# Patient Record
Sex: FEMALE | Race: WHITE | NOT HISPANIC OR LATINO | URBAN - METROPOLITAN AREA
[De-identification: names, ages, dates, MRNs, and addresses within clinical notes are randomized per-mention and may not be internally consistent; named-entity substitution may affect disease eponyms.]

---

## 2019-06-05 ENCOUNTER — EMERGENCY (EMERGENCY)
Facility: HOSPITAL | Age: 24
LOS: 1 days | Discharge: HOME | End: 2019-06-05
Attending: EMERGENCY MEDICINE | Admitting: EMERGENCY MEDICINE
Payer: COMMERCIAL

## 2019-06-05 VITALS
DIASTOLIC BLOOD PRESSURE: 70 MMHG | SYSTOLIC BLOOD PRESSURE: 113 MMHG | TEMPERATURE: 97 F | OXYGEN SATURATION: 100 % | WEIGHT: 134.92 LBS | HEART RATE: 80 BPM | RESPIRATION RATE: 14 BRPM

## 2019-06-05 VITALS
DIASTOLIC BLOOD PRESSURE: 88 MMHG | HEART RATE: 82 BPM | TEMPERATURE: 98 F | SYSTOLIC BLOOD PRESSURE: 144 MMHG | OXYGEN SATURATION: 99 % | RESPIRATION RATE: 16 BRPM

## 2019-06-05 LAB
ALBUMIN SERPL ELPH-MCNC: 4.6 G/DL — SIGNIFICANT CHANGE UP (ref 3.3–5)
ALP SERPL-CCNC: 28 U/L — LOW (ref 40–120)
ALT FLD-CCNC: 8 U/L — LOW (ref 10–45)
ANION GAP SERPL CALC-SCNC: 9 MMOL/L — SIGNIFICANT CHANGE UP (ref 5–17)
APPEARANCE UR: CLEAR — SIGNIFICANT CHANGE UP
AST SERPL-CCNC: 12 U/L — SIGNIFICANT CHANGE UP (ref 10–40)
BASOPHILS # BLD AUTO: 0.01 K/UL — SIGNIFICANT CHANGE UP (ref 0–0.2)
BASOPHILS NFR BLD AUTO: 0.2 % — SIGNIFICANT CHANGE UP (ref 0–2)
BILIRUB SERPL-MCNC: 0.2 MG/DL — SIGNIFICANT CHANGE UP (ref 0.2–1.2)
BILIRUB UR-MCNC: NEGATIVE — SIGNIFICANT CHANGE UP
BUN SERPL-MCNC: 14 MG/DL — SIGNIFICANT CHANGE UP (ref 7–23)
CALCIUM SERPL-MCNC: 8.7 MG/DL — SIGNIFICANT CHANGE UP (ref 8.4–10.5)
CHLORIDE SERPL-SCNC: 107 MMOL/L — SIGNIFICANT CHANGE UP (ref 96–108)
CO2 SERPL-SCNC: 24 MMOL/L — SIGNIFICANT CHANGE UP (ref 22–31)
COLOR SPEC: YELLOW — SIGNIFICANT CHANGE UP
CREAT SERPL-MCNC: 0.72 MG/DL — SIGNIFICANT CHANGE UP (ref 0.5–1.3)
DIFF PNL FLD: NEGATIVE — SIGNIFICANT CHANGE UP
EOSINOPHIL # BLD AUTO: 0.14 K/UL — SIGNIFICANT CHANGE UP (ref 0–0.5)
EOSINOPHIL NFR BLD AUTO: 2.5 % — SIGNIFICANT CHANGE UP (ref 0–6)
GLUCOSE SERPL-MCNC: 130 MG/DL — HIGH (ref 70–99)
GLUCOSE UR QL: NEGATIVE — SIGNIFICANT CHANGE UP
HCG UR QL: NEGATIVE — SIGNIFICANT CHANGE UP
HCT VFR BLD CALC: 35.3 % — SIGNIFICANT CHANGE UP (ref 34.5–45)
HGB BLD-MCNC: 11.5 G/DL — SIGNIFICANT CHANGE UP (ref 11.5–15.5)
IMM GRANULOCYTES NFR BLD AUTO: 0.4 % — SIGNIFICANT CHANGE UP (ref 0–1.5)
KETONES UR-MCNC: NEGATIVE — SIGNIFICANT CHANGE UP
LEUKOCYTE ESTERASE UR-ACNC: NEGATIVE — SIGNIFICANT CHANGE UP
LYMPHOCYTES # BLD AUTO: 1.12 K/UL — SIGNIFICANT CHANGE UP (ref 1–3.3)
LYMPHOCYTES # BLD AUTO: 20.2 % — SIGNIFICANT CHANGE UP (ref 13–44)
MCHC RBC-ENTMCNC: 29.9 PG — SIGNIFICANT CHANGE UP (ref 27–34)
MCHC RBC-ENTMCNC: 32.6 GM/DL — SIGNIFICANT CHANGE UP (ref 32–36)
MCV RBC AUTO: 91.9 FL — SIGNIFICANT CHANGE UP (ref 80–100)
MONOCYTES # BLD AUTO: 0.45 K/UL — SIGNIFICANT CHANGE UP (ref 0–0.9)
MONOCYTES NFR BLD AUTO: 8.1 % — SIGNIFICANT CHANGE UP (ref 2–14)
NEUTROPHILS # BLD AUTO: 3.81 K/UL — SIGNIFICANT CHANGE UP (ref 1.8–7.4)
NEUTROPHILS NFR BLD AUTO: 68.6 % — SIGNIFICANT CHANGE UP (ref 43–77)
NITRITE UR-MCNC: NEGATIVE — SIGNIFICANT CHANGE UP
NRBC # BLD: 0 /100 WBCS — SIGNIFICANT CHANGE UP (ref 0–0)
PH UR: 6.5 — SIGNIFICANT CHANGE UP (ref 5–8)
PLATELET # BLD AUTO: 170 K/UL — SIGNIFICANT CHANGE UP (ref 150–400)
POTASSIUM SERPL-MCNC: 4.2 MMOL/L — SIGNIFICANT CHANGE UP (ref 3.5–5.3)
POTASSIUM SERPL-SCNC: 4.2 MMOL/L — SIGNIFICANT CHANGE UP (ref 3.5–5.3)
PROT SERPL-MCNC: 7.3 G/DL — SIGNIFICANT CHANGE UP (ref 6–8.3)
PROT UR-MCNC: 30 MG/DL
RBC # BLD: 3.84 M/UL — SIGNIFICANT CHANGE UP (ref 3.8–5.2)
RBC # FLD: 12 % — SIGNIFICANT CHANGE UP (ref 10.3–14.5)
SODIUM SERPL-SCNC: 140 MMOL/L — SIGNIFICANT CHANGE UP (ref 135–145)
SP GR SPEC: 1.02 — SIGNIFICANT CHANGE UP (ref 1–1.03)
UROBILINOGEN FLD QL: 0.2 E.U./DL — SIGNIFICANT CHANGE UP
WBC # BLD: 5.55 K/UL — SIGNIFICANT CHANGE UP (ref 3.8–10.5)
WBC # FLD AUTO: 5.55 K/UL — SIGNIFICANT CHANGE UP (ref 3.8–10.5)

## 2019-06-05 PROCEDURE — 99284 EMERGENCY DEPT VISIT MOD MDM: CPT

## 2019-06-05 PROCEDURE — 99284 EMERGENCY DEPT VISIT MOD MDM: CPT | Mod: 25

## 2019-06-05 PROCEDURE — 81001 URINALYSIS AUTO W/SCOPE: CPT

## 2019-06-05 PROCEDURE — 70450 CT HEAD/BRAIN W/O DYE: CPT | Mod: 26

## 2019-06-05 PROCEDURE — 81025 URINE PREGNANCY TEST: CPT

## 2019-06-05 PROCEDURE — 80053 COMPREHEN METABOLIC PANEL: CPT

## 2019-06-05 PROCEDURE — 82962 GLUCOSE BLOOD TEST: CPT

## 2019-06-05 PROCEDURE — 70450 CT HEAD/BRAIN W/O DYE: CPT

## 2019-06-05 PROCEDURE — 85025 COMPLETE CBC W/AUTO DIFF WBC: CPT

## 2019-06-05 PROCEDURE — 36415 COLL VENOUS BLD VENIPUNCTURE: CPT

## 2019-06-05 RX ORDER — OLANZAPINE 15 MG/1
0 TABLET, FILM COATED ORAL
Qty: 0 | Refills: 0 | DISCHARGE

## 2019-06-05 RX ORDER — LAMOTRIGINE 25 MG/1
0 TABLET, ORALLY DISINTEGRATING ORAL
Qty: 0 | Refills: 0 | DISCHARGE

## 2019-06-05 RX ORDER — CLOMIPRAMINE HYDROCHLORIDE 50 MG/1
0 CAPSULE ORAL
Qty: 0 | Refills: 0 | DISCHARGE

## 2019-06-05 RX ORDER — DEXTROAMPHETAMINE SACCHARATE, AMPHETAMINE ASPARTATE, DEXTROAMPHETAMINE SULFATE AND AMPHETAMINE SULFATE 1.875; 1.875; 1.875; 1.875 MG/1; MG/1; MG/1; MG/1
0 TABLET ORAL
Qty: 0 | Refills: 0 | DISCHARGE

## 2019-06-05 NOTE — ED ADULT NURSE NOTE - CHIEF COMPLAINT QUOTE
working out on elliptical at the gym and had 2 seizures - does not recall start of seizures- both witnessed ; bot tongue; denies other injuries; FS in field [place]

## 2019-06-05 NOTE — ED PROVIDER NOTE - OBJECTIVE STATEMENT
23 y/o F with PMHx of anxiety, depression presents to the ED s/p seizure-like activity. Patient states she was on the elliptical at the gym when she slipped off the machine and loss consciousness. Patient states EMS told her she had a seizure as she was twitching after fall and disoriented. Mother notes patient had pinpoint pupils which resolved approximately 20 minutes prior to arrival. Patient has no prior history of seizures. States she is feeling well in the ED with slight headache. Denies leg pain, chest pain, abdominal pain, recent illness, visual changes, weakness or any other acute complaints. Patient recently discontinued Ativan. 25 y/o F with PMHx of anxiety, depression presents to the ED s/p seizure-like activity. Patient states she was on the elliptical at the gym when she slipped off the machine and loss consciousness. Patient states EMS told her she had a seizure as she was twitching after fall and disoriented. Mother notes patient had pinpoint pupils which resolved approximately 20 minutes prior to arrival. Patient has no prior history of seizures. States she is feeling well in the ED with slight headache. Denies leg pain, chest pain, abdominal pain, recent illness, visual changes, weakness or any other acute complaints. No other injury, no neck pain. Patient recently discontinued Ativan.

## 2019-06-05 NOTE — ED PROVIDER NOTE - NSFOLLOWUPINSTRUCTIONS_ED_ALL_ED_FT
Seizure, Adult  A seizure is a sudden burst of abnormal electrical activity in the brain. The abnormal activity temporarily interrupts normal brain function, causing a person to experience any of the following:  Involuntary movements.  Changes in awareness or consciousness.  Uncontrollable shaking (convulsions).  Seizures usually last from 30 seconds to 2 minutes. They usually do not cause permanent brain damage unless they are prolonged.    What are the causes?  This condition may be caused by:  Fever.  Low blood sugar.  Medicine.  Illness.  Brain injury.  Brain tumor.  Stroke.  A condition that is passed from parent to child (genetic).  Addiction to a substance (substanceuse disorder) or suddenly stopping the use of a substance (withdrawal).  Some people who have a seizure never have another one. People who have repeated seizures have a condition called epilepsy.    What are the signs or symptoms?  Symptoms of this condition vary greatly from person to person. They include:  Convulsions.  Stiffening of the body.  Involuntary movements of the arms or legs.  Loss of consciousness.  Breathing problems.  Falling suddenly.  Confusion.  Head nodding.  Eye blinking or fluttering.  Lip smacking or tongue biting.  Drooling.  Rapid eye movements.  Grunting.  Loss of bladder control and bowel control.  Staring.  Unresponsiveness.  Some people have symptoms right before a seizure happens (aura) and right after a seizure happens.  Symptoms that may occur before a seizure include:  Fear or anxiety.  Nausea.  Feeling like the room is spinning (vertigo).  A feeling of having seen or heard something before (déjà vu).  Odd tastes or smells.  Changes in vision, such as seeing flashing lights or spots.  Symptoms that may occur after a seizure include:  Confusion.  Sleepiness.  Headache.  Weakness on one side of the body.  How is this diagnosed?  This condition may be diagnosed based on medical history and physical exam.    You may also have other tests, including:  Blood tests.  Electroencephalogram, EEG.  CT scan.  MRI.  Spinal tap (lumbar puncture).  How is this treated?  Most seizures will stop on their own in under 5 minutes and no treatment is needed. Seizures lasting longer than 5 minutes will usually need treatment. Treatment includes:  Medicines given through IV.  Avoiding known triggers, such as medicines that you take for another condition.  Medicines to treat epilepsy (antiepileptics), if epilepsy caused your seizures.  Surgery to stop seizures, if you have epilepsy that does not respond to medicines.  Follow these instructions at home:  Medicines     Image   Take over-the-counter and prescription medicines only as told by your health care provider.  Avoid any substances that may prevent your medicine from working properly, such as alcohol.  Activity     Do not drive, swim, or do any other activities that would be dangerous if you had another seizure. Wait until your health care provider says it is safe to do them.  If you live in the [place] check with your local DMV (department of [employer]) to find out about local driving laws. Each state has specific rules about when you can legally return to driving.  Get enough rest. Lack of sleep can make seizures more likely to occur.  Educating others     ImageTeach friends and family what to do if you have a seizure. They should:  Lay you on the ground to prevent a fall.  Cushion your head and body.  Loosen any tight clothing around your neck.  Turn you on your side. If vomiting occurs, this helps keep your airway clear.  Not hold you down. Holding you down will not stop the seizure.  Not put anything into your mouth.  Know whether or not you need emergency care.  Stay with you until you recover.  General instructions     Contact your health care provider each time you have a seizure.  Avoid anything that has ever triggered a seizure for you.  Keep a seizure diary. Record what you remember about each seizure, especially anything that might have triggered the seizure.  Keep all follow-up visits as told by your health care provider. This is important.  Contact a health care provider if:  You have another seizure.  You have seizures more often.  Your seizure symptoms change.  You continue to have seizures with treatment.  You have symptoms of an infection or illness. These might increase your risk of having a seizure.  Get help right away if:  You have a seizure that:  Lasts longer than 5 minutes.  Is different than previous seizures.  Leaves you unable to speak or use a part of your body.  Makes it harder to breathe.  You have:  A seizure after a head injury.  Multiple seizures in a row.  Confusion or a severe headache right after a seizure.  You are having seizures more often.  You do not wake up immediately after a seizure.  You injure yourself during a seizure.  These symptoms may represent a serious problem that is an emergency. Do not wait to see if the symptoms will go away. Get medical help right away. Call your local emergency services (911 in the [place] Do not drive yourself to the hospital.     Summary  Seizures are caused by abnormal electrical activity in the brain. The activity disrupts normal brain function, leading to a change in consciousness, abnormal movements, or convulsions.  There are many causes of seizures including illnesses, medicines, genetic conditions, head injuries, strokes, tumors, substance abuse, or substance withdrawal.  Most seizures will stop on their own in under 5 minutes. Seizures lasting longer than 5 minutes are a medical emergency and require immediate treatment.  There are many medicines that are used to treat seizures. Take over-the-counter and prescription medicines only as told by your health care provider.  This information is not intended to replace advice given to you by your health care provider. Make sure you discuss any questions you have with your health care provider.

## 2019-06-05 NOTE — ED PROVIDER NOTE - PROGRESS NOTE DETAILS
Unable to get through to pt's psychiatrist, will given neuro fu, Discussed with pt results of work up, strict return precautions, and need for follow up.  Pt expressed understanding and agrees with plan.

## 2019-06-05 NOTE — ED ADULT NURSE NOTE - OBJECTIVE STATEMENT
Patient is a 25yo female reporting two witnessed seizures while at the gym on the elliptical today. No hx of seizures. Patient denies any chest pain, shortness of breath, abdominal pain, n/v/d. Minor tongue trauma noted. Patient reports she had been taking Ativan for anxiety daily for months but stopped taking it on Monday.

## 2019-06-05 NOTE — ED PROVIDER NOTE - CARE PROVIDER_API CALL
Igor Siddiqui (MD)  Clinical Neurophysiology; EEGEpilepsy; Neurology; Sleep Medicine  [address], [address] [place] [address], [place] [address]  Phone: [phone]  Fax: [phone]  Follow Up Time:

## 2019-06-05 NOTE — ED ADULT NURSE NOTE - INTERVENTIONS DEFINITIONS
Monitor gait and stability/Non-slip footwear when patient is off stretcher/Physically safe environment: no spills, clutter or unnecessary equipment/Instruct patient to call for assistance/Stretcher in lowest position, wheels locked, appropriate side rails in place

## 2019-06-05 NOTE — ED PROVIDER NOTE - CLINICAL SUMMARY MEDICAL DECISION MAKING FREE TEXT BOX
25 y/o F with history of anxiety/depression presents with potential episode of seizure. Patient now with headache. Recently stopped Ativan. Will obtain labs and CT head.

## 2019-06-05 NOTE — ED ADULT TRIAGE NOTE - CHIEF COMPLAINT QUOTE
working out on elliptical at the gym and had 2 seizures - does not recall start of seizures- both witnessed ; bot tongue; denies other injueirs working out on elliptical at the gym and had 2 seizures - does not recall start of seizures- both witnessed ; bot tongue; denies other injuries; FS in field [place]

## 2019-06-06 ENCOUNTER — INBOUND DOCUMENT (OUTPATIENT)
Age: 24
End: 2019-06-06

## 2019-06-06 PROBLEM — Z00.00 ENCOUNTER FOR PREVENTIVE HEALTH EXAMINATION: Status: ACTIVE | Noted: 2019-06-06

## 2019-06-09 DIAGNOSIS — Z79.899 OTHER LONG TERM (CURRENT) DRUG THERAPY: ICD-10-CM

## 2019-06-09 DIAGNOSIS — R56.9 UNSPECIFIED CONVULSIONS: ICD-10-CM

## 2019-06-09 DIAGNOSIS — Z91.048 OTHER NONMEDICINAL SUBSTANCE ALLERGY STATUS: ICD-10-CM

## 2019-06-09 DIAGNOSIS — R51 HEADACHE: ICD-10-CM

## 2025-01-26 NOTE — ED PROVIDER NOTE - SKIN, MLM
Pt ambulatory to ed c/o of post op complication. Pt had a hernia repair surgery this morning and was DC from  at 10:30am. Pt endorses abdominal pain at insertion site and new RUQ pain. Pt denies fever, chills. PMHX of colostomy. PTA  took unknown prescribed antibiotics and Advil at 1630., Skin normal color for race, warm, dry and intact. No evidence of rash.